# Patient Record
Sex: MALE | Race: OTHER
[De-identification: names, ages, dates, MRNs, and addresses within clinical notes are randomized per-mention and may not be internally consistent; named-entity substitution may affect disease eponyms.]

---

## 2020-06-04 ENCOUNTER — HOSPITAL ENCOUNTER (EMERGENCY)
Dept: HOSPITAL 92 - ERS | Age: 20
Discharge: HOME | End: 2020-06-04
Payer: SELF-PAY
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
## 2020-06-04 DIAGNOSIS — S83.92XA: Primary | ICD-10-CM

## 2020-06-04 NOTE — RAD
LEFT KNEE FOUR VIEW:

6/4/20

 

HISTORY:

Injury.  Pain.

 

COMPARISON: 

None.

 

FINDINGS: 

No acute fracture or malalignment. No significant joint effusion. Soft tissues are unremarkable.

 

IMPRESSION: 

No acute osseous abnormality.

 

POS: HOME